# Patient Record
Sex: MALE | Race: WHITE | NOT HISPANIC OR LATINO | URBAN - NONMETROPOLITAN AREA
[De-identification: names, ages, dates, MRNs, and addresses within clinical notes are randomized per-mention and may not be internally consistent; named-entity substitution may affect disease eponyms.]

---

## 2019-07-08 ENCOUNTER — APPOINTMENT (OUTPATIENT)
Dept: URBAN - NONMETROPOLITAN AREA CLINIC 54 | Age: 84
Setting detail: DERMATOLOGY
End: 2019-07-09

## 2019-07-08 DIAGNOSIS — L82.1 OTHER SEBORRHEIC KERATOSIS: ICD-10-CM

## 2019-07-08 DIAGNOSIS — Z85.828 PERSONAL HISTORY OF OTHER MALIGNANT NEOPLASM OF SKIN: ICD-10-CM

## 2019-07-08 PROBLEM — D48.5 NEOPLASM OF UNCERTAIN BEHAVIOR OF SKIN: Status: ACTIVE | Noted: 2019-07-08

## 2019-07-08 PROCEDURE — OTHER SHAVE REMOVAL AND DESTRUCTION: OTHER

## 2019-07-08 PROCEDURE — 11102 TANGNTL BX SKIN SINGLE LES: CPT | Mod: 59

## 2019-07-08 PROCEDURE — 99202 OFFICE O/P NEW SF 15 MIN: CPT | Mod: 25

## 2019-07-08 PROCEDURE — OTHER NOTED ON EXAM BUT NOT TREATED: OTHER

## 2019-07-08 PROCEDURE — OTHER COUNSELING: OTHER

## 2019-07-08 PROCEDURE — 17281 DSTR MAL LS F/E/E/N/L/M .6-1: CPT

## 2019-07-08 PROCEDURE — OTHER BIOPSY BY SHAVE METHOD: OTHER

## 2019-07-08 ASSESSMENT — LOCATION DETAILED DESCRIPTION DERM
LOCATION DETAILED: LEFT INFERIOR LATERAL LOWER BACK
LOCATION DETAILED: STERNUM

## 2019-07-08 ASSESSMENT — LOCATION ZONE DERM: LOCATION ZONE: TRUNK

## 2019-07-08 ASSESSMENT — LOCATION SIMPLE DESCRIPTION DERM
LOCATION SIMPLE: LEFT LOWER BACK
LOCATION SIMPLE: CHEST

## 2019-07-08 NOTE — PROCEDURE: BIOPSY BY SHAVE METHOD
Bill 98334 For Specimen Handling/Conveyance To Laboratory?: no
Silver Nitrate Text: The wound bed was treated with silver nitrate after the biopsy was performed.
Biopsy Type: H and E
Wound Care: Petrolatum
Was A Bandage Applied: Yes
Anesthesia Type: 1% lidocaine with epinephrine and a 1:10 solution of 8.4% sodium bicarbonate
Notification Instructions: Patient will be notified of biopsy results. However, patient instructed to call the office if not contacted within 2 weeks.
Curettage Text: The wound bed was treated with curettage after the biopsy was performed.
Anesthesia Volume In Cc (Will Not Render If 0): 0.5
Post-Care Instructions: Post op instructions reviewed and written copy offered.
X Size Of Lesion In Cm: 0.8
Biopsy Method: 15 blade
Consent: Discussed treatment options and patient had all questions answered to satisfaction prior to treatment. Oral informed consent was obtained and risks were reviewed including but not limited to  pain, infection, bleeding, scar, change of skin texture and/or color, nerve damage, blisters, allergy, and recurrence of lesion.
Billing Type: Third-Party Bill
Cryotherapy Text: The wound bed was treated with cryotherapy after the biopsy was performed.
Electrodesiccation And Curettage Text: The wound bed was treated with electrodesiccation and curettage after the biopsy was performed.
Type Of Destruction Used: Curettage
Electrodesiccation Text: The wound bed was treated with electrodesiccation after the biopsy was performed.
Additional Anesthesia Volume In Cc (Will Not Render If 0): 0
Size Of Lesion In Cm: 0.9
Hemostasis: Aluminum Chloride and Electrocautery
Dressing: bandage
Detail Level: Detailed
Depth Of Biopsy: dermis

## 2019-07-08 NOTE — PROCEDURE: SHAVE REMOVAL AND DESTRUCTION
Wound Care: Petrolatum
Bill For Surgical Tray: no
Size After Destruction (Required For Destruction Billing): 0.6
Anesthesia Volume In Cc: 0
Hemostasis: Aluminum Chloride and Electrocautery
Dressing: dry sterile dressing
Consent: Discussed treatment options and patient had all questions answered to satisfaction prior to treatment. Oral informed consent was obtained and risks were reviewed including but not limited to  pain, infection, bleeding, scar, change of skin texture and/or color, nerve damage, blisters, allergy, and recurrence of lesion.
Render Post-Care Instructions In Note?: yes
Billing Type: Third-Party Bill
Number Of Curettages: 3
Cautery Type: electrodesiccation
Post-Care Instructions: Post op instructions reviewed and written copy offered.
Anesthesia Type: 1% lidocaine with epinephrine and a 1:10 solution of 8.4% sodium bicarbonate
Size Of Lesion In Cm: 0.5
Bill As?: Note: Bill Malignant Destruction If Path Confirms Malignant Lesion. Only Bill As Shave Removal If Path Comes Back Benign. Do Not Bill Shave Removal On Malignant Lesions.: Malignant Destruction
Detail Level: Detailed
Notification Instructions: Patient will be notified of biopsy results. However, patient instructed to call the office if not contacted within 2 weeks.
Anesthesia Volume In Cc: 1

## 2019-07-08 NOTE — HPI: UPPER BODY SKIN CHECK
What Is The Reason For Today's Visit?: Skin Lesions
Additional History: Spot on tip of nose recurs & itches.

## 2019-08-19 ENCOUNTER — APPOINTMENT (OUTPATIENT)
Dept: URBAN - NONMETROPOLITAN AREA CLINIC 54 | Age: 84
Setting detail: DERMATOLOGY
End: 2019-11-20

## 2019-08-19 PROBLEM — C44.91 BASAL CELL CARCINOMA OF SKIN, UNSPECIFIED: Status: ACTIVE | Noted: 2019-08-19

## 2019-08-19 PROCEDURE — OTHER MOHS SURGERY PHONE CONSULTATION: OTHER

## 2019-08-19 NOTE — PROCEDURE: MOHS SURGERY PHONE CONSULTATION
Has The Patient Ever Had A Heart Attack Or Stroke?: No
Additional Information?: lmtcb
Patient Reported Location: nasal Supra tip
Detail Level: Simple
Which Antibiotic Do They Take For Surgical Prophylaxis?: Amoxicillin (2 grams)
Has The Pathology Report Been Received?: Yes

## 2019-08-30 ENCOUNTER — APPOINTMENT (OUTPATIENT)
Dept: URBAN - NONMETROPOLITAN AREA CLINIC 54 | Age: 84
Setting detail: DERMATOLOGY
End: 2019-09-06

## 2019-08-30 PROBLEM — C44.311 BASAL CELL CARCINOMA OF SKIN OF NOSE: Status: ACTIVE | Noted: 2019-08-30

## 2019-08-30 PROCEDURE — OTHER CURETTAGE AND DESTRUCTION: OTHER

## 2019-08-30 PROCEDURE — A4550 SURGICAL TRAYS: HCPCS

## 2019-08-30 PROCEDURE — 17282 DSTR MAL LS F/E/E/N/L/M1.1-2: CPT

## 2019-08-30 NOTE — PROCEDURE: CURETTAGE AND DESTRUCTION
Post-Care Instructions: 1. A band-aid has been placed over the biopsy site. Keep the area clean and dry until your next bath or shower, preferably 12-24 hours from the time of the biopsy.\\n2. Remove the band-aid and gently clean once or twice a day with mild soap and water. Apply a thin layer of Vaseline or Aquaphor ointment and cover with a bandage once or twice daily. We do not recommend products containing neomycin (such as Neosporin or triple antibiotic ointment) or polysporin because some patients can develop an allergy to these products, even with prior, uncomplicated use. The goal is to keep the wound moist and prevent a scab.\\n3. It is normal to have a little pinkness (1/8 inch) around the area of the biopsy. The center of the biopsy site may appear slightly whitish or yellow in color, similar to a moist scab in appearance. As the wound continues to heal the \"pink\" will overtake the whitish center. The pink will eventually fade.\\n4. Please note, lower extremity (leg) wounds of any sort will require more time (often weeks to months) to heal completely. Often a purple yady will remain for an indefinite period of time. In addition, a certain amount of swelling is normal when a biopsy is performed on any leg or finger. Elevation will assist in minimizing your swelling. Call us if you are concerned.\\n5. If bleeding occurs after you leave the office, hold steady pressure for 15-20 minutes without \"peeking\" to stop it. If the bleeding continues, call our office [phone]. Ask to have the doctor paged if after hours.\\n6. To optimize wound healing avoid: smoking, direct sun exposure, and strenuous exercise.\\n* Call the office if you develop: pain, fever, drainage of pus, or severe itching.\\n* As a policy, a patient with benign results will not receive a call on the pathology results, unless we specifically discussed a call back at the time of your visit, i.e. \"no news is good news.\"\\n\\n**ALL BIOPSY RESULTS REQUIRING FOLLOW UP TREATMENT BEYOND A YEARLY FOLLOW UP WILL BE COMMUNICATED TO THE PATIENT WITHIN TWO WEEKS. WITH THE OCCASIONAL EXCEPTION OF SPECIMENS THAT REQUIRE ADDITIONAL SPECIALIZED STAINS, TESTS, OR CONSULTATIONS. IN THOSE RARE SITUATIONS, YOUR RESULTS\\nMAY BE DELAYED, AND WE WILL MAKE EVERY EFFORT TO KEEP YOU INFORMED UNTIL A FINAL RESULT HAS BEEN RECEIVED. Post-Care Instructions: 1. A band-aid has been placed over the biopsy site. Keep the area clean and dry until your next bath or shower, preferably 12-24 hours from the time of the biopsy.\\n2. Remove the band-aid and gently clean once or twice a day with mild soap and water. Apply a thin layer of Vaseline or Aquaphor ointment and cover with a bandage once or twice daily. We do not recommend products containing neomycin (such as Neosporin or triple antibiotic ointment) or polysporin because some patients can develop an allergy to these products, even with prior, uncomplicated use. The goal is to keep the wound moist and prevent a scab.\\n3. It is normal to have a little pinkness (1/8 inch) around the area of the biopsy. The center of the biopsy site may appear slightly whitish or yellow in color, similar to a moist scab in appearance. As the wound continues to heal the \"pink\" will overtake the whitish center. The pink will eventually fade.\\n4. Please note, lower extremity (leg) wounds of any sort will require more time (often weeks to months) to heal completely. Often a purple yady will remain for an indefinite period of time. In addition, a certain amount of swelling is normal when a biopsy is performed on any leg or finger. Elevation will assist in minimizing your swelling. Call us if you are concerned.\\n5. If bleeding occurs after you leave the office, hold steady pressure for 15-20 minutes without \"peeking\" to stop it. If the bleeding continues, call our office [phone]. Ask to have the doctor paged if after hours.\\n6. To optimize wound healing avoid: smoking, direct sun exposure, and strenuous exercise.\\n* Call the office if you develop: pain, fever, drainage of pus, or severe itching.\\n* As a policy, a patient with benign results will not receive a call on the pathology results, unless we specifically discussed a call back at the time of your visit, i.e. \"no news is good news.\"\\n\\n**ALL BIOPSY RESULTS REQUIRING FOLLOW UP TREATMENT BEYOND A YEARLY FOLLOW UP WILL BE COMMUNICATED TO THE PATIENT WITHIN TWO WEEKS. WITH THE OCCASIONAL EXCEPTION OF SPECIMENS THAT REQUIRE ADDITIONAL SPECIALIZED STAINS, TESTS, OR CONSULTATIONS. IN THOSE RARE SITUATIONS, YOUR RESULTS\\nMAY BE DELAYED, AND WE WILL MAKE EVERY EFFORT TO KEEP YOU INFORMED UNTIL A FINAL RESULT HAS BEEN RECEIVED.

## 2019-12-16 ENCOUNTER — APPOINTMENT (OUTPATIENT)
Dept: URBAN - NONMETROPOLITAN AREA CLINIC 54 | Age: 84
Setting detail: DERMATOLOGY
End: 2019-12-17

## 2019-12-16 DIAGNOSIS — Z85.828 PERSONAL HISTORY OF OTHER MALIGNANT NEOPLASM OF SKIN: ICD-10-CM

## 2019-12-16 DIAGNOSIS — T81.89 OTHER COMPLICATIONS OF PROCEDURES, NOT ELSEWHERE CLASSIFIED: ICD-10-CM

## 2019-12-16 DIAGNOSIS — L25.9 UNSPECIFIED CONTACT DERMATITIS, UNSPECIFIED CAUSE: ICD-10-CM

## 2019-12-16 DIAGNOSIS — L82.0 INFLAMED SEBORRHEIC KERATOSIS: ICD-10-CM

## 2019-12-16 PROBLEM — T81.89XA OTHER COMPLICATIONS OF PROCEDURES, NOT ELSEWHERE CLASSIFIED, INITIAL ENCOUNTER: Status: ACTIVE | Noted: 2019-12-16

## 2019-12-16 PROCEDURE — OTHER ADDITIONAL NOTES: OTHER

## 2019-12-16 PROCEDURE — 99213 OFFICE O/P EST LOW 20 MIN: CPT | Mod: 25

## 2019-12-16 PROCEDURE — OTHER RECOMMENDATIONS: OTHER

## 2019-12-16 PROCEDURE — OTHER COUNSELING: OTHER

## 2019-12-16 PROCEDURE — 17110 DESTRUCT B9 LESION 1-14: CPT

## 2019-12-16 PROCEDURE — OTHER LIQUID NITROGEN: OTHER

## 2019-12-16 ASSESSMENT — LOCATION SIMPLE DESCRIPTION DERM
LOCATION SIMPLE: LEFT GREAT TOE
LOCATION SIMPLE: GLUTEAL CLEFT
LOCATION SIMPLE: LEFT TEMPLE
LOCATION SIMPLE: LEFT CHEEK
LOCATION SIMPLE: SCALP

## 2019-12-16 ASSESSMENT — LOCATION DETAILED DESCRIPTION DERM
LOCATION DETAILED: GLUTEAL CLEFT
LOCATION DETAILED: LEFT INFERIOR POSTAURICULAR SKIN
LOCATION DETAILED: LEFT MID PREAURICULAR CHEEK
LOCATION DETAILED: LEFT LATERAL TEMPLE
LOCATION DETAILED: LEFT DISTAL PLANTAR GREAT TOE

## 2019-12-16 ASSESSMENT — LOCATION ZONE DERM
LOCATION ZONE: SCALP
LOCATION ZONE: TOE
LOCATION ZONE: TRUNK
LOCATION ZONE: FACE

## 2019-12-16 NOTE — PROCEDURE: LIQUID NITROGEN
Duration Of Freeze Thaw-Cycle (Seconds): 10-15
Add 52 Modifier (Optional): no
Medical Necessity Information: It is in your best interest to select a reason for this procedure from the list below. All of these items fulfill various CMS LCD requirements except the new and changing color options.
Post-Care Instructions: I reviewed with the patient in detail post-care instructions. Patient is to wear sunprotection, and avoid picking at any of the treated lesions. Pt may apply Vaseline to crusted or scabbing areas.
Medical Necessity Clause: This procedure was medically necessary because the lesions that were treated were:
Detail Level: Detailed
Consent: The patient's consent was obtained including but not limited to risks of crusting, scabbing, blistering, scarring, darker or lighter pigmentary change, recurrence, incomplete removal and infection.
Number Of Freeze-Thaw Cycles: 1 freeze-thaw cycle

## 2019-12-16 NOTE — PROCEDURE: RECOMMENDATIONS
Recommendation Preamble: The following recommendations were made during the visit:
Detail Level: Zone
Recommendations (Free Text): Hydrocolloid bandage applied to affected area of buttocks.

## 2019-12-16 NOTE — PROCEDURE: ADDITIONAL NOTES
Detail Level: Zone
Detail Level: Simple
Additional Notes: Patient’s daughter states about 1 yr ago he got a sore on L great toe from ill-fitting shoes. After that was corrected, this area scabs & starts to heal & he bumps it & it’s open again.
Additional Notes: Suspect chronic pressure and friction since the patient is wheelchair bound.
Additional Notes: I'm concerned about an underlying osteomyelitis. Recommended evaluation by orthopedic surgeon or a podiatrist, with Xrays.

## 2022-04-10 NOTE — PROCEDURE: CURETTAGE AND DESTRUCTION
Concentration (Mg/Ml Or Millions Of Plaque Forming Units/Cc): 0.01 Patient/Caregiver provided printed discharge information.